# Patient Record
Sex: FEMALE | Race: WHITE | NOT HISPANIC OR LATINO | Employment: UNEMPLOYED | URBAN - METROPOLITAN AREA
[De-identification: names, ages, dates, MRNs, and addresses within clinical notes are randomized per-mention and may not be internally consistent; named-entity substitution may affect disease eponyms.]

---

## 2017-07-17 ENCOUNTER — HOSPITAL ENCOUNTER (EMERGENCY)
Facility: HOSPITAL | Age: 37
Discharge: HOME | End: 2017-07-17
Attending: EMERGENCY MEDICINE

## 2017-07-17 VITALS
SYSTOLIC BLOOD PRESSURE: 128 MMHG | OXYGEN SATURATION: 98 % | RESPIRATION RATE: 18 BRPM | TEMPERATURE: 99 F | HEART RATE: 94 BPM | WEIGHT: 130 LBS | DIASTOLIC BLOOD PRESSURE: 78 MMHG | HEIGHT: 65 IN | BODY MASS INDEX: 21.66 KG/M2

## 2017-07-17 DIAGNOSIS — L02.91 ABSCESS: Primary | ICD-10-CM

## 2017-07-17 LAB
B-HCG UR QL: NEGATIVE
CTP QC/QA: YES

## 2017-07-17 PROCEDURE — 96372 THER/PROPH/DIAG INJ SC/IM: CPT

## 2017-07-17 PROCEDURE — 81025 URINE PREGNANCY TEST: CPT

## 2017-07-17 PROCEDURE — 99283 EMERGENCY DEPT VISIT LOW MDM: CPT | Mod: 25

## 2017-07-17 PROCEDURE — 25000003 PHARM REV CODE 250: Performed by: PHYSICIAN ASSISTANT

## 2017-07-17 PROCEDURE — 10060 I&D ABSCESS SIMPLE/SINGLE: CPT

## 2017-07-17 RX ORDER — IBUPROFEN 400 MG/1
800 TABLET ORAL
Status: COMPLETED | OUTPATIENT
Start: 2017-07-17 | End: 2017-07-17

## 2017-07-17 RX ORDER — CLINDAMYCIN HYDROCHLORIDE 150 MG/1
300 CAPSULE ORAL 4 TIMES DAILY
Qty: 56 CAPSULE | Refills: 0 | Status: SHIPPED | OUTPATIENT
Start: 2017-07-17 | End: 2017-07-24

## 2017-07-17 RX ORDER — CLINDAMYCIN PHOSPHATE 150 MG/ML
600 INJECTION, SOLUTION INTRAVENOUS
Status: COMPLETED | OUTPATIENT
Start: 2017-07-17 | End: 2017-07-17

## 2017-07-17 RX ORDER — LIDOCAINE HYDROCHLORIDE 10 MG/ML
10 INJECTION INFILTRATION; PERINEURAL
Status: COMPLETED | OUTPATIENT
Start: 2017-07-17 | End: 2017-07-17

## 2017-07-17 RX ADMIN — CLINDAMYCIN PHOSPHATE 600 MG: 150 INJECTION, SOLUTION INTRAVENOUS at 05:07

## 2017-07-17 RX ADMIN — IBUPROFEN 800 MG: 400 TABLET, FILM COATED ORAL at 05:07

## 2017-07-17 RX ADMIN — LIDOCAINE HYDROCHLORIDE 10 ML: 10 INJECTION, SOLUTION INFILTRATION; PERINEURAL at 05:07

## 2017-07-17 NOTE — ED PROVIDER NOTES
Encounter Date: 7/17/2017       History     Chief Complaint   Patient presents with    Abscess     abscess to right forearm since yesterday. Site is red, warm, tender to touch. Redness is spreading up forearm     Marcela Mir, a 36 y.o. female that presents to the ED for abscess to right forearm that x 2 days.  She believes this area started at a flea bite which she has continued to scratch.  The area has become inflamed with redness spreading over her forearm.  She's had some purulent discharge from the site.  She denies any fevers, chills, pain with range of motion of her wrist or elbow.  No treatment tried.            Review of patient's allergies indicates:   Allergen Reactions    Nickel sutures [surgical stainless steel] Rash     Allergic to nickel metals    Zofran [ondansetron hcl (pf)] Rash     Vomiting and diarrhea     Past Medical History:   Diagnosis Date    Insomnia     Seizures      Past Surgical History:   Procedure Laterality Date    ARM NEUROPLASTY Left     KNEE ARTHROPLASTY Right     OVARIAN CYST REMOVAL       No family history on file.  Social History   Substance Use Topics    Smoking status: Never Smoker    Smokeless tobacco: Not on file    Alcohol use No     Review of Systems   Constitutional: Negative for chills and fever.   Gastrointestinal: Negative for nausea and vomiting.   Musculoskeletal: Negative for arthralgias and joint swelling.   Skin: Positive for color change (R forearm ).   Allergic/Immunologic: Negative for immunocompromised state.   Neurological: Negative for dizziness.   Hematological: Negative for adenopathy.   Psychiatric/Behavioral: Negative for agitation and confusion.   All other systems reviewed and are negative.      Physical Exam     Initial Vitals [07/17/17 1638]   BP Pulse Resp Temp SpO2   122/80 98 20 98.6 °F (37 °C) 100 %      MAP       94         Physical Exam    Nursing note and vitals reviewed.  Constitutional: She appears well-developed and  well-nourished. No distress.   HENT:   Head: Normocephalic and atraumatic.   Right Ear: External ear normal.   Left Ear: External ear normal.   Nose: Nose normal.   Mouth/Throat: Oropharynx is clear and moist.   Eyes: Conjunctivae and EOM are normal.   Neck: Normal range of motion.   Cardiovascular: Normal rate and regular rhythm.   Pulmonary/Chest: Breath sounds normal. No respiratory distress. She has no wheezes.   Musculoskeletal: Normal range of motion. She exhibits no tenderness.        Right elbow: Normal.       Right wrist: Normal.   Neurological: She is alert and oriented to person, place, and time. She has normal strength.   Skin: Skin is warm and dry. Abscess noted. No rash noted. No erythema.   Area of fluctuance measuring approximately 6 cm x 4 cm with underlying cellulitis measures approximately 11 cm x 8 cm circumferentially to her right forearm   Psychiatric: She has a normal mood and affect. Thought content normal.         ED Course   I & D - Incision and Drainage  Date/Time: 7/17/2017 11:04 PM  Location procedure was performed: [facility] EMERGENCY DEPARTMENT  Performed by: MORALES ROBERTS  Authorized by: LEFORT, GUY J.   Consent Done: Yes  Consent: Verbal consent obtained.  Consent given by: patient  Type: abscess  Body area: upper extremity  Location details: right arm  Anesthesia: local infiltration    Anesthesia:  Local Anesthetic: lidocaine 1% without epinephrine  Anesthetic total: 3 mL  Scalpel size: 11  Incision type: single straight  Complexity: simple  Drainage: pus and  bloody  Drainage amount: moderate  Wound treatment: incision,  expression of material and  deloculation  Complications: No  Specimens: No  Implants: No        Labs Reviewed - No data to display          Medical Decision Making:   Initial Assessment:   Concern for abscess  Differential Diagnosis:   Abscess, infection, folliculitis   ED Management:  Patient presents to ED and mild distress, afebrile, and nontoxic.  She's got good  AROM of right elbow and right wrist with evidence of erythema, swelling or tenderness to these joints.  Ibuprofen and clindamycin were given in the emergency department.  I&D performed with expression of purulent material.  Patient tolerated procedure well.  Area of cellulitis was marked and patient was instructed to return, should erythema grow.  She was instructed to f/u with PCP for further evaluation of this site and to take tylenol and motrin as needed for pain.   Strict return precautions given and patient verbalized understanding.    RX: clindamycin    Other:   I discussed test(s) with the performing physician.                   ED Course     Clinical Impression:             The encounter diagnosis was Abscess.                           Anais Ni PA-C  07/17/17 [id number]       Anais Ni PA-C  07/17/17 4468

## 2017-07-17 NOTE — ED NOTES
"Started out as a "bug bite" 5 days ago, was itchy and red but has continued to grow in size.  Now is hard, warm, tender and swollen to touch and redness now extends to posteror forearm spreading up towards elbow.  Occasional greenish pus drains and clear pus drainage.    "

## 2019-11-09 ENCOUNTER — HOSPITAL ENCOUNTER (EMERGENCY)
Facility: HOSPITAL | Age: 39
Discharge: HOME | End: 2019-11-09
Attending: EMERGENCY MEDICINE
Payer: MEDICAID

## 2019-11-09 VITALS
WEIGHT: 135 LBS | RESPIRATION RATE: 20 BRPM | TEMPERATURE: 99 F | SYSTOLIC BLOOD PRESSURE: 110 MMHG | OXYGEN SATURATION: 100 % | BODY MASS INDEX: 22.49 KG/M2 | DIASTOLIC BLOOD PRESSURE: 68 MMHG | HEIGHT: 65 IN | HEART RATE: 62 BPM

## 2019-11-09 DIAGNOSIS — L02.416 ABSCESS OF LEFT KNEE: Primary | ICD-10-CM

## 2019-11-09 DIAGNOSIS — Z76.89 ENCOUNTER FOR INCISION AND DRAINAGE PROCEDURE: ICD-10-CM

## 2019-11-09 DIAGNOSIS — S83.92XA SPRAIN OF LEFT KNEE, UNSPECIFIED LIGAMENT, INITIAL ENCOUNTER: ICD-10-CM

## 2019-11-09 DIAGNOSIS — S89.90XA KNEE INJURY: ICD-10-CM

## 2019-11-09 DIAGNOSIS — L02.91 ABSCESS: ICD-10-CM

## 2019-11-09 LAB
B-HCG UR QL: NEGATIVE
CTP QC/QA: YES

## 2019-11-09 PROCEDURE — 87077 CULTURE AEROBIC IDENTIFY: CPT

## 2019-11-09 PROCEDURE — 10060 I&D ABSCESS SIMPLE/SINGLE: CPT

## 2019-11-09 PROCEDURE — 25000003 PHARM REV CODE 250: Performed by: NURSE PRACTITIONER

## 2019-11-09 PROCEDURE — 99284 EMERGENCY DEPT VISIT MOD MDM: CPT | Mod: 25

## 2019-11-09 PROCEDURE — 87070 CULTURE OTHR SPECIMN AEROBIC: CPT

## 2019-11-09 PROCEDURE — 87186 SC STD MICRODIL/AGAR DIL: CPT

## 2019-11-09 RX ORDER — HYDROCODONE BITARTRATE AND ACETAMINOPHEN 5; 325 MG/1; MG/1
1 TABLET ORAL
Status: COMPLETED | OUTPATIENT
Start: 2019-11-09 | End: 2019-11-09

## 2019-11-09 RX ORDER — IBUPROFEN 600 MG/1
600 TABLET ORAL
Status: COMPLETED | OUTPATIENT
Start: 2019-11-09 | End: 2019-11-09

## 2019-11-09 RX ORDER — CEPHALEXIN 500 MG/1
500 CAPSULE ORAL
Status: COMPLETED | OUTPATIENT
Start: 2019-11-09 | End: 2019-11-09

## 2019-11-09 RX ORDER — CEPHALEXIN 500 MG/1
500 CAPSULE ORAL EVERY 6 HOURS
Qty: 28 CAPSULE | Refills: 0 | Status: SHIPPED | OUTPATIENT
Start: 2019-11-09 | End: 2019-11-16

## 2019-11-09 RX ORDER — LIDOCAINE HYDROCHLORIDE 10 MG/ML
10 INJECTION INFILTRATION; PERINEURAL ONCE
Status: COMPLETED | OUTPATIENT
Start: 2019-11-09 | End: 2019-11-09

## 2019-11-09 RX ORDER — SULFAMETHOXAZOLE AND TRIMETHOPRIM 800; 160 MG/1; MG/1
1 TABLET ORAL 2 TIMES DAILY
Qty: 14 TABLET | Refills: 0 | Status: SHIPPED | OUTPATIENT
Start: 2019-11-09 | End: 2019-11-16

## 2019-11-09 RX ORDER — HYDROCODONE BITARTRATE AND ACETAMINOPHEN 5; 325 MG/1; MG/1
1 TABLET ORAL EVERY 6 HOURS PRN
Qty: 8 TABLET | Refills: 0 | Status: SHIPPED | OUTPATIENT
Start: 2019-11-09

## 2019-11-09 RX ADMIN — IBUPROFEN 600 MG: 600 TABLET, FILM COATED ORAL at 11:11

## 2019-11-09 RX ADMIN — LIDOCAINE HYDROCHLORIDE 10 ML: 10 INJECTION, SOLUTION INFILTRATION; PERINEURAL at 11:11

## 2019-11-09 RX ADMIN — CEPHALEXIN 500 MG: 500 CAPSULE ORAL at 11:11

## 2019-11-09 RX ADMIN — HYDROCODONE BITARTRATE AND ACETAMINOPHEN 1 TABLET: 5; 325 TABLET ORAL at 11:11

## 2019-11-09 NOTE — ED PROVIDER NOTES
Encounter Date: 11/9/2019       History     Chief Complaint   Patient presents with    Knee Pain     Reports a week ago her knee had twisted under her and reports pain and swelling worsening. Has been taking Motrin without relief.      39-year-old female presents the ED for left knee pain. 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  She had knee pain at that time but over the past 2 days she has noticed redness and a possible abscess to the medial aspect of her knee.  She denies wound at the site.  She does have history of abscesses that have required I and D in the past.  The patient has had surgery on the left knee in the past.  She reports a fever of 101 last night.  No numbness or tingling.  The pain is worse with movement and touch.  She has tried nothing for the pain. No other complaints this time. Pt denies IV drug use.     The history is provided by the patient.     Review of patient's allergies indicates:   Allergen Reactions    Nickel sutures [surgical stainless steel] Rash     Allergic to nickel metals    Zofran [ondansetron hcl (pf)] Rash     Vomiting and diarrhea     Past Medical History:   Diagnosis Date    Insomnia     Seizures      Past Surgical History:   Procedure Laterality Date    ARM NEUROPLASTY Left     KNEE ARTHROPLASTY Right     OVARIAN CYST REMOVAL       No family history on file.  Social History     Tobacco Use    Smoking status: Never Smoker   Substance Use Topics    Alcohol use: No     Alcohol/week: 0.0 standard drinks    Drug use: No     Review of Systems   Constitutional: Positive for activity change and fever.   HENT: Negative for congestion.    Respiratory: Negative for cough and shortness of breath.    Cardiovascular: Negative for leg swelling.   Gastrointestinal: Negative for vomiting.   Musculoskeletal: Positive for arthralgias and joint swelling.   Skin: Positive for color change. Negative for rash.        Abscess  Bruising left knee    Allergic/Immunologic: Positive for immunocompromised state.   Neurological: Negative for weakness and numbness.   Hematological: Does not bruise/bleed easily.   Psychiatric/Behavioral: Negative for confusion and self-injury.       Physical Exam     Initial Vitals [11/09/19 1030]   BP Pulse Resp Temp SpO2   130/75 73 17 98.7 °F (37.1 °C) 100 %      MAP       --         Physical Exam    Nursing note and vitals reviewed.  Constitutional: Vital signs are normal. She appears well-developed and well-nourished. She is active and cooperative. She is easily aroused.  Non-toxic appearance. She does not have a sickly appearance. She does not appear ill. No distress.   HENT:   Head: Normocephalic and atraumatic.   Eyes: Conjunctivae and EOM are normal.   Neck: Normal range of motion. Neck supple.   Cardiovascular: Normal rate.   Pulses:       Dorsalis pedis pulses are 2+ on the right side, and 2+ on the left side.   Pulmonary/Chest: Effort normal.   Abdominal: Soft. Normal appearance and bowel sounds are normal.   Musculoskeletal:        Left knee: She exhibits swelling and erythema. She exhibits normal range of motion, no effusion, no ecchymosis, no deformity, no laceration, normal alignment, no LCL laxity, normal patellar mobility, no bony tenderness, normal meniscus and no MCL laxity. Tenderness found. Medial joint line tenderness noted. No lateral joint line, no MCL, no LCL and no patellar tendon tenderness noted.        Left upper leg: Normal.        Left lower leg: Normal.   Neurological: She is alert, oriented to person, place, and time and easily aroused. She has normal strength. No sensory deficit. GCS eye subscore is 4. GCS verbal subscore is 5. GCS motor subscore is 6.   Skin: Skin is warm, dry and intact. Capillary refill takes less than 2 seconds. Bruising and abscess noted. No abrasion and no rash noted. There is erythema.   Psychiatric: She has a normal mood and affect. Her speech is normal and behavior is  "normal. Judgment and thought content normal. Cognition and memory are normal.         ED Course   I & D - Incision and Drainage  Date/Time: 11/9/2019 12:30 PM  Location procedure was performed: [facility] EMERGENCY DEPARTMENT  Performed by: IRIS Velarde  Authorized by: Harrison Lopez MD   Pre-operative diagnosis: abscess  Post-operative diagnosis: abscess  Consent Done: Yes  Consent: Verbal consent obtained. Written consent not obtained.  Risks and benefits: risks, benefits and alternatives were discussed  Consent given by: patient  Patient understanding: patient states understanding of the procedure being performed  Patient consent: the patient's understanding of the procedure matches consent given  Procedure consent: procedure consent matches procedure scheduled  Imaging studies: imaging studies available  Patient identity confirmed: verbally with patient  Time out: Immediately prior to procedure a "time out" was called to verify the correct patient, procedure, equipment, support staff and site/side marked as required.  Type: abscess  Body area: lower extremity  Location details: left leg  Anesthesia: local infiltration    Anesthesia:  Local anesthesia used: yes  Local Anesthetic: lidocaine 1% without epinephrine  Anesthetic total: 3 mL  Patient sedated: no  Description of findings: moderate amount green pus   Scalpel size: 11  Incision type: single straight  Complexity: simple  Drainage: pus  Drainage amount: moderate  Wound treatment: incision,  drainage and  wound packed  Packing material: 1/4 in gauze  Complications: No  Estimated blood loss (mL): 3  Specimens: Yes (wound culture)  Implants: No  Patient tolerance: Patient tolerated the procedure well with no immediate complications        Labs Reviewed   CULTURE, AEROBIC  (SPECIFY SOURCE)          Imaging Results          X-Ray Knee 3 View Left (Final result)  Result time 11/09/19 11:44:53    Final result by Kirby Roth DO (11/09/19 " 11:44:53)                 Impression:      1. No acute fracture or dislocation of the left knee.  2. Mild subcutaneous edema within the anteromedial aspect of the left knee.      Electronically signed by: Kirby Roth  Date:    11/09/2019  Time:    11:44             Narrative:    EXAMINATION:  XR KNEE 3 VIEW LEFT    CLINICAL HISTORY:  Unspecified injury of unspecified lower leg, initial encounter    TECHNIQUE:  AP, lateral, and patellofemoral radiographs of the left knee.    COMPARISON:  None available.    FINDINGS:  Bone mineralization is within normal limits.  There is no acute fracture or dislocation of the left knee.  The visualized osseous structures are in anatomic alignment and joint spaces are preserved.  There is mild subcutaneous edema within the anteromedial aspect of the left knee.  There is no left knee joint effusion.                                 Medical Decision Making:   Initial Assessment:   38yo female here for left knee injury one week ago and abscess for two days.  She reports fever at home but is afebrile here.  Pt is on chronic steroid therapy for pmhx of MS. Pt appears well, nontoxic.  Full AROM of left knee.  NVI distal to injury.  2cm abscess to the medial aspect of knee.  No erythema of joint.  No joint laxity.  Differential Diagnosis:   Strain, sprain, abscess, cellulitis, foreign body  Clinical Tests:   Lab Tests: Ordered and Reviewed  Radiological Study: Ordered and Reviewed  ED Management:  Xray, PO keflex, I&D  Other:   I have discussed this case with another health care provider.       <> Summary of the Discussion: Discussed with Dr. Shah who did assess this patient.  He agrees with ED course and disposition.  No signs of sepsis or septic joint.  I and D was performed in the ED.  Please see the procedure note.  X-ray negative for bony changes in foreign body.  She is stable for outpatient treatment.  I do not suspect the abscess extends to the joint capsule.  Reviewed strict  return precautions including fever, worsening of pain, erythema, or redness, or if she has any questions or concerns.  The patient verbalized understanding, compliance, and agreement with the treatment plan.  She reports that she feels comfortable with discharge at this time.  I also reviewed narcotic prescription precautions.              Attending Attestation:     Physician Attestation Statement for NP/PA:   I have conducted a face to face encounter with this patient in addition to the NP/PA, due to NP/PA Request    Other NP/PA Attestation Additions:    History of Present Illness: 39-year-old female with left knee pain.   Physical Exam: Left knee with tender fluctuant swelling over the medial aspect with overlying erythema.  Good range of motion. No signs of septic joint.    Procedure Note: Incision and drainage of knee abscess.                              Clinical Impression:       ICD-10-CM ICD-9-CM   1. Abscess of left knee L02.416 682.6   2. Abscess L02.91 682.9   3. Knee injury S89.90XA 959.7   4. Sprain of left knee, unspecified ligament, initial encounter S83.92XA 844.9   5. Encounter for incision and drainage procedure Z01.89 V72.85                             IRIS Velarde  11/09/19 1724       Harrison Lopez MD  11/14/19 0608

## 2019-11-09 NOTE — DISCHARGE INSTRUCTIONS
Wound recheck in two days.  Watch for worsening including fever, increased pain, increased redness, difficulty moving your knee, any questions or concerns.

## 2019-11-11 ENCOUNTER — HOSPITAL ENCOUNTER (EMERGENCY)
Facility: HOSPITAL | Age: 39
Discharge: HOME | End: 2019-11-11
Attending: EMERGENCY MEDICINE
Payer: MEDICAID

## 2019-11-11 VITALS
HEART RATE: 67 BPM | RESPIRATION RATE: 19 BRPM | WEIGHT: 135 LBS | SYSTOLIC BLOOD PRESSURE: 109 MMHG | OXYGEN SATURATION: 100 % | HEIGHT: 65 IN | TEMPERATURE: 98 F | DIASTOLIC BLOOD PRESSURE: 64 MMHG | BODY MASS INDEX: 22.49 KG/M2

## 2019-11-11 DIAGNOSIS — Z51.89 VISIT FOR WOUND CHECK: ICD-10-CM

## 2019-11-11 DIAGNOSIS — Z48.00 ABSCESS PACKING REMOVAL: Primary | ICD-10-CM

## 2019-11-11 PROCEDURE — 99281 EMR DPT VST MAYX REQ PHY/QHP: CPT

## 2019-11-11 NOTE — ED PROVIDER NOTES
Encounter Date: 11/11/2019       History     Chief Complaint   Patient presents with    Wound Check     pt presents to ED today to remove packing from wound to left knee      40yo female presents to the ED for a wound check.  Pt was seen in this ED two days ago and had an I&D of an abscess on her left knee.  She reports compliance with home antibiotics.  No fever/chills, vomiting.  She states the redness and the area is improving and it is still draining. Pt states that her pain has significantly improved and she is able to move her knee without much pain.  Pt currently on Bactrim and Keflex.  No other complaints at this time.      The history is provided by the patient.     Review of patient's allergies indicates:   Allergen Reactions    Nickel sutures [surgical stainless steel] Rash     Allergic to nickel metals    Zofran [ondansetron hcl (pf)] Rash     Vomiting and diarrhea     Past Medical History:   Diagnosis Date    Insomnia     Seizures      Past Surgical History:   Procedure Laterality Date    ARM NEUROPLASTY Left     KNEE ARTHROPLASTY Right     OVARIAN CYST REMOVAL       No family history on file.  Social History     Tobacco Use    Smoking status: Never Smoker   Substance Use Topics    Alcohol use: No     Alcohol/week: 0.0 standard drinks    Drug use: No     Review of Systems   Constitutional: Negative for activity change, chills and fever.   Gastrointestinal: Negative for vomiting.   Musculoskeletal: Positive for arthralgias. Negative for joint swelling.   Skin: Positive for wound.        Abscess left knee with purulent drainage.    Allergic/Immunologic: Positive for immunocompromised state.   Neurological: Negative for weakness and numbness.   Hematological: Does not bruise/bleed easily.   Psychiatric/Behavioral: Negative for confusion.       Physical Exam     Initial Vitals [11/11/19 1147]   BP Pulse Resp Temp SpO2   109/64 67 19 98.3 °F (36.8 °C) 100 %      MAP       --         Physical  Exam    Nursing note and vitals reviewed.  Constitutional: Vital signs are normal. She appears well-developed and well-nourished. She is active and cooperative. She is easily aroused.  Non-toxic appearance. She does not have a sickly appearance. She does not appear ill. No distress.   HENT:   Head: Normocephalic and atraumatic.   Eyes: Conjunctivae and EOM are normal.   Neck: Normal range of motion. Neck supple.   Cardiovascular: Normal rate.   Pulses:       Posterior tibial pulses are 2+ on the right side, and 2+ on the left side.   Pulmonary/Chest: Effort normal.   Abdominal: Normal appearance.   Musculoskeletal:        Left knee: She exhibits ecchymosis (improved). She exhibits normal range of motion, no swelling, no effusion, no deformity, no laceration, no erythema, normal alignment, no LCL laxity, normal patellar mobility, no bony tenderness, normal meniscus and no MCL laxity. Tenderness found. Medial joint line tenderness noted.        Left upper leg: Normal.        Left lower leg: Normal.   Neurological: She is alert, oriented to person, place, and time and easily aroused. She has normal strength. No sensory deficit. GCS eye subscore is 4. GCS verbal subscore is 5. GCS motor subscore is 6.   Skin: Skin is warm, dry and intact. Capillary refill takes less than 2 seconds. Abscess noted. No rash noted.   1cm area of induration left medial aspect of knee draining small amount of purulent material.  No significant overlying erythema.     Psychiatric: She has a normal mood and affect. Her speech is normal and behavior is normal. Judgment and thought content normal. Cognition and memory are normal.         ED Course   Procedures  Labs Reviewed - No data to display       Imaging Results    None          Medical Decision Making:   Initial Assessment:   40yo female here for an I&D site wound check and packing removal of left knee abscess.  She reports improvement of the area and compliance with her antibiotics.  Pt  "appears well, nontoxic.  Full AROM of left knee.  Area significantly improved since two days ago.  NVI distal to abscess. Abscess still draining a small amount of purulent material.    Differential Diagnosis:   Wound check, packing removal, abscess  ED Management:  Packing removed and wound repacked with 1/2" gauze.      No signs of septic joint.  Abscess and cellulitis is improving but is still draining purulent material.  Advised wound check in two days and continued use of Bactrim and Keflex.  Wound culture pending.     I reviewed strict return precautions. In addition, pt is to return to the ED if condition changes, progresses, or if there are any concerns.  Pt verbalized understanding, compliance, and agreement with the treatment plan.                                     Clinical Impression:       ICD-10-CM ICD-9-CM   1. Abscess packing removal Z48.00 V58.30   2. Visit for wound check Z51.89 V58.89                             Pennie Darby, IRIS  11/11/19 1208    "

## 2019-11-12 LAB — BACTERIA SPEC AEROBE CULT: ABNORMAL

## 2019-11-13 ENCOUNTER — HOSPITAL ENCOUNTER (EMERGENCY)
Facility: HOSPITAL | Age: 39
Discharge: HOME | End: 2019-11-13
Attending: EMERGENCY MEDICINE
Payer: MEDICAID

## 2019-11-13 VITALS
HEIGHT: 65 IN | TEMPERATURE: 98 F | SYSTOLIC BLOOD PRESSURE: 118 MMHG | RESPIRATION RATE: 20 BRPM | WEIGHT: 135 LBS | OXYGEN SATURATION: 97 % | BODY MASS INDEX: 22.49 KG/M2 | DIASTOLIC BLOOD PRESSURE: 70 MMHG | HEART RATE: 71 BPM

## 2019-11-13 DIAGNOSIS — Z51.89 VISIT FOR WOUND CHECK: Primary | ICD-10-CM

## 2019-11-13 PROCEDURE — 99281 EMR DPT VST MAYX REQ PHY/QHP: CPT

## 2019-11-13 NOTE — DISCHARGE INSTRUCTIONS
Continue prescribed antibiotics.  Follow up with PCP in 2-3 days and return to ED for any concerns or worsening symptoms, such as fever, increased redness, or drainage.

## 2019-11-13 NOTE — ED PROVIDER NOTES
Encounter Date: 11/13/2019    SCRIBE #1 NOTE: I, Ashley Benjamin, am scribing for, and in the presence of,  Spencer Hilton. I have scribed the entire note.       History     Chief Complaint   Patient presents with    Wound Check     pt presents to ED today reports packing came out of wound to left knee while changing bandage      Marcela Mir is a 39 y.o. female who  has a past medical history of Insomnia and Seizures.    The patient presents to the ED due to wound check. She reports onset of symptoms was 6 days ago. The patient was initially evaluated secondary to abscess to left knee. At which time the patient had the lesion I&D and placed on Keflex/Bactrim. The patient then returned for a previous wound check 2 days ago. The patient had the wound packed and told to return in a few days to have the packing returned. However, this morning when she changed the bandage the packing came out. She wanted to be sure more packing did not need to be placed nor a change in antibiotics. The patient denies any associated fever, chills, increased redness, swelling, nausea or vomiting.     The history is provided by the patient.     Review of patient's allergies indicates:   Allergen Reactions    Nickel sutures [surgical stainless steel] Rash     Allergic to nickel metals    Zofran [ondansetron hcl (pf)] Rash     Vomiting and diarrhea     Past Medical History:   Diagnosis Date    Insomnia     Seizures      Past Surgical History:   Procedure Laterality Date    ARM NEUROPLASTY Left     KNEE ARTHROPLASTY Right     OVARIAN CYST REMOVAL       History reviewed. No pertinent family history.  Social History     Tobacco Use    Smoking status: Never Smoker   Substance Use Topics    Alcohol use: No     Alcohol/week: 0.0 standard drinks    Drug use: No     Review of Systems   Constitutional: Negative for chills and fever.   Gastrointestinal: Negative for nausea and vomiting.   Skin: Positive for wound. Negative for color  change.   Allergic/Immunologic: Positive for immunocompromised state.   All other systems reviewed and are negative.      Physical Exam     Initial Vitals [11/13/19 1248]   BP Pulse Resp Temp SpO2   118/70 71 20 97.9 °F (36.6 °C) 97 %      MAP       --         Physical Exam    Vitals reviewed.  Constitutional: She appears well-developed and well-nourished.  Non-toxic appearance. She does not have a sickly appearance.   HENT:   Head: Atraumatic.   Mouth/Throat: Oropharynx is clear and moist.   Eyes: EOM are normal.   Neck: Normal range of motion and phonation normal. Neck supple.   Cardiovascular: Regular rhythm.   Pulmonary/Chest: No respiratory distress.   Abdominal: Soft.   Musculoskeletal: Normal range of motion. She exhibits tenderness. She exhibits no edema.   Left tenderness to medial joint line. Healing abscess noted with no packing. 1 cm area induration to left medial aspect of knee. No drainage. No significant overlying erythema or edema.      Neurological: She is alert and oriented to person, place, and time. She has normal strength. No sensory deficit.   Skin: Skin is warm and dry.   Psychiatric: She has a normal mood and affect.         ED Course   Procedures  Labs Reviewed - No data to display       Imaging Results    None          Medical Decision Making:   History:   Old Medical Records: I decided to obtain old medical records.  Old Records Summarized: other records.       <> Summary of Records: Seen on the 9th, received I&D to left knee and placed on Keflex and Bactrim. She returned on the 11th for a wound check.   Initial Assessment:   Patient presents ED for wound check.  ED Management:  No need for labs or imaging at this time.  No signs of septic joint.  Abscess and cellulitis is improving and culture and sensitivity confirms that patient is on correct antibiotics.  Patient instructed to continue prescribed antibiotics. I reviewed strict return precautions. In addition, pt is to return to the ED  if condition changes, progresses, or if there are any concerns.  Pt verbalized understanding, compliance, and agreement with the treatment plan.                                      Clinical Impression:     1. Visit for wound check           I, Spencer Lin, personally performed the services described in this documentation. All medical record entries made by the scribe were at my direction and in my presence.  I have reviewed the chart and agree that the record reflects my personal performance and is accurate and complete. IRIS Roland.  8:59 PM 11/13/2019                 Spencer Lin NP  11/13/19 0879

## 2021-01-05 ENCOUNTER — LAB VISIT (OUTPATIENT)
Dept: PRIMARY CARE CLINIC | Facility: OTHER | Age: 41
End: 2021-01-05
Attending: INTERNAL MEDICINE
Payer: MEDICAID

## 2021-01-05 DIAGNOSIS — R50.9 FEVER: ICD-10-CM

## 2021-01-05 DIAGNOSIS — R05.9 COUGH: ICD-10-CM

## 2021-01-05 DIAGNOSIS — Z03.818 ENCOUNTER FOR OBSERVATION FOR SUSPECTED EXPOSURE TO OTHER BIOLOGICAL AGENTS RULED OUT: ICD-10-CM

## 2021-01-05 DIAGNOSIS — R68.83 CHILLS: ICD-10-CM

## 2021-01-05 DIAGNOSIS — R09.81 CONGESTION OF NASAL SINUS: ICD-10-CM

## 2021-01-05 PROCEDURE — U0003 INFECTIOUS AGENT DETECTION BY NUCLEIC ACID (DNA OR RNA); SEVERE ACUTE RESPIRATORY SYNDROME CORONAVIRUS 2 (SARS-COV-2) (CORONAVIRUS DISEASE [COVID-19]), AMPLIFIED PROBE TECHNIQUE, MAKING USE OF HIGH THROUGHPUT TECHNOLOGIES AS DESCRIBED BY CMS-2020-01-R: HCPCS

## 2021-01-06 DIAGNOSIS — U07.1 COVID-19 VIRUS DETECTED: ICD-10-CM

## 2021-01-06 LAB — SARS-COV-2 RNA RESP QL NAA+PROBE: DETECTED

## 2021-05-04 ENCOUNTER — PATIENT MESSAGE (OUTPATIENT)
Dept: RESEARCH | Facility: HOSPITAL | Age: 41
End: 2021-05-04

## 2021-05-10 ENCOUNTER — PATIENT MESSAGE (OUTPATIENT)
Dept: RESEARCH | Facility: HOSPITAL | Age: 41
End: 2021-05-10